# Patient Record
(demographics unavailable — no encounter records)

---

## 2025-04-15 NOTE — ASSESSMENT
[FreeTextEntry1] : Inappropriate Sinus Tachycardia  - Patient has mild case of inappropriate sinus tachycardia without symptoms.   - Reviewed with patient and patient's mother the circadian rhythm of heart rate and the fact that patient has normal heart rate variability.   - At this time, would like to continue to monitor patient. No medications at this time.   - Will repeat event monitor at next visit.

## 2025-04-15 NOTE — ADDENDUM
[FreeTextEntry1] : Jaleesa ANTHONY assisted in documentation on 04/15/2025   acting as a scribe for Dr. Ramon Eason.

## 2025-04-15 NOTE — HISTORY OF PRESENT ILLNESS
[FreeTextEntry1] : Patient is a 21-year-old female with no major PMHx who presents today for evaluation of increased heart rate. 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 Event monitor done with Dr. Francisco showed normal heart rate at night, decreasing to as low as 50-60 bpm. However, during the day, patient's average heart rate is above 100 bpm. Patient does not feel any symptoms and denies any palpitations, chest pain, shortness of breath, or syncope. Patient feels well overall and did not know her heart rate was elevated.     EKG (04/15/2025): Sinus rhythm at 73 bpm   Echo (03/2025): Normal EF, no major valvular disease.  Holter monitor (03/2025): Average heart rate of 95 bpm, range between  bpm. No other arrhythmias.

## 2025-04-15 NOTE — END OF VISIT
[FreeTextEntry3] :  All medical record entries made by my scribe were at my, Dr. Ramon Eason, direction and personally dictated by me. I have reviewed the chart and agree that the record accurately reflects my personal performance of the history, physical exam, assessment and plan. I have also personally directed, reviewed, and agreed with the chart.